# Patient Record
Sex: FEMALE | Race: OTHER | HISPANIC OR LATINO | URBAN - METROPOLITAN AREA
[De-identification: names, ages, dates, MRNs, and addresses within clinical notes are randomized per-mention and may not be internally consistent; named-entity substitution may affect disease eponyms.]

---

## 2017-03-12 ENCOUNTER — EMERGENCY (EMERGENCY)
Facility: HOSPITAL | Age: 52
LOS: 1 days | Discharge: HOME | End: 2017-03-12
Attending: EMERGENCY MEDICINE | Admitting: EMERGENCY MEDICINE
Payer: COMMERCIAL

## 2017-03-12 VITALS
TEMPERATURE: 98 F | OXYGEN SATURATION: 97 % | HEIGHT: 61 IN | HEART RATE: 83 BPM | DIASTOLIC BLOOD PRESSURE: 81 MMHG | SYSTOLIC BLOOD PRESSURE: 133 MMHG | WEIGHT: 143.74 LBS | RESPIRATION RATE: 18 BRPM

## 2017-03-12 VITALS
OXYGEN SATURATION: 97 % | DIASTOLIC BLOOD PRESSURE: 84 MMHG | HEART RATE: 78 BPM | TEMPERATURE: 98 F | RESPIRATION RATE: 18 BRPM | SYSTOLIC BLOOD PRESSURE: 128 MMHG

## 2017-03-12 DIAGNOSIS — R07.89 OTHER CHEST PAIN: ICD-10-CM

## 2017-03-12 DIAGNOSIS — R05 COUGH: ICD-10-CM

## 2017-03-12 DIAGNOSIS — J45.909 UNSPECIFIED ASTHMA, UNCOMPLICATED: ICD-10-CM

## 2017-03-12 PROCEDURE — 99283 EMERGENCY DEPT VISIT LOW MDM: CPT | Mod: 25

## 2017-03-12 PROCEDURE — 71046 X-RAY EXAM CHEST 2 VIEWS: CPT

## 2017-03-12 PROCEDURE — 99284 EMERGENCY DEPT VISIT MOD MDM: CPT | Mod: 25

## 2017-03-12 PROCEDURE — 93010 ELECTROCARDIOGRAM REPORT: CPT

## 2017-03-12 PROCEDURE — 71020: CPT | Mod: 26

## 2017-03-12 PROCEDURE — 93005 ELECTROCARDIOGRAM TRACING: CPT

## 2017-03-12 RX ORDER — HYDROCODONE BITARTRATE AND HOMATROPINE METHYLBROMIDE 5; 1.5 MG/5ML; MG/5ML
5 SOLUTION ORAL
Qty: 60 | Refills: 0
Start: 2017-03-12 | End: 2017-03-14

## 2017-03-12 RX ORDER — PROMETHAZINE HCL/CODEINE
5 SYRUP ORAL
Qty: 60 | Refills: 0
Start: 2017-03-12 | End: 2017-03-15

## 2017-03-12 RX ORDER — IBUPROFEN 200 MG
800 TABLET ORAL ONCE
Qty: 0 | Refills: 0 | Status: COMPLETED | OUTPATIENT
Start: 2017-03-12 | End: 2017-03-12

## 2017-03-12 RX ADMIN — Medication 800 MILLIGRAM(S): at 11:49

## 2017-03-12 NOTE — ED PROVIDER NOTE - OBJECTIVE STATEMENT
chest pain  52 yo with 4 days of URI symptoms w cough, now with C.P. L side only with coughing, currently no pain as cough improved but was feeling it prior to arrival, sharp in nature when coughs, no fever, non productive, no SOB, no leg pain or swelling

## 2017-03-12 NOTE — ED ADULT NURSE NOTE - OBJECTIVE STATEMENT
Patient presents to the ED complaining of non-productive cough since Thursday. Patient complaining of bilateral lower rib pain with cough. No fever or chills. Equal rise of chest. Lung sounds clear. No labored. breathing. Hx of asthma.

## 2017-03-12 NOTE — ED PROVIDER NOTE - CARDIAC, MLM
Normal rate, regular rhythm.  Heart sounds S1, S2.  No murmurs, rubs or gallops. L chest wall tender to palpation

## 2017-03-12 NOTE — ED PROVIDER NOTE - MEDICAL DECISION MAKING DETAILS
pt with chest pain w cough , now improving URI symptoms, suspect chest wall strain secondary to cough, no pna or pneumo on xray, no rib fracture apparent. EKG w no ischemic changes , doubt ACS. Pt stable for d/c w NSAIDs advised.

## 2017-04-19 ENCOUNTER — RESULT REVIEW (OUTPATIENT)
Age: 52
End: 2017-04-19

## 2017-04-20 ENCOUNTER — OUTPATIENT (OUTPATIENT)
Dept: OUTPATIENT SERVICES | Facility: HOSPITAL | Age: 52
LOS: 1 days | Discharge: HOME | End: 2017-04-20

## 2017-04-20 ENCOUNTER — OUTPATIENT (OUTPATIENT)
Dept: EMERGENCY DEPT | Facility: HOSPITAL | Age: 52
LOS: 1 days | Discharge: HOME | End: 2017-04-20
Payer: COMMERCIAL

## 2017-04-20 VITALS
TEMPERATURE: 98 F | SYSTOLIC BLOOD PRESSURE: 147 MMHG | HEART RATE: 127 BPM | DIASTOLIC BLOOD PRESSURE: 80 MMHG | OXYGEN SATURATION: 97 % | RESPIRATION RATE: 18 BRPM | HEIGHT: 61 IN

## 2017-04-20 VITALS
RESPIRATION RATE: 16 BRPM | HEART RATE: 98 BPM | DIASTOLIC BLOOD PRESSURE: 79 MMHG | OXYGEN SATURATION: 97 % | TEMPERATURE: 98 F | SYSTOLIC BLOOD PRESSURE: 132 MMHG

## 2017-04-20 DIAGNOSIS — L76.82 OTHER POSTPROCEDURAL COMPLICATIONS OF SKIN AND SUBCUTANEOUS TISSUE: ICD-10-CM
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
## 2017-04-20 DIAGNOSIS — Z90.13 ACQUIRED ABSENCE OF BILATERAL BREASTS AND NIPPLES: Chronic | ICD-10-CM

## 2017-04-20 LAB
ALBUMIN SERPL ELPH-MCNC: 3.3 G/DL — LOW (ref 3.4–5)
ALP SERPL-CCNC: 55 U/L — SIGNIFICANT CHANGE UP (ref 40–120)
ALT FLD-CCNC: 25 U/L — SIGNIFICANT CHANGE UP (ref 12–42)
ANION GAP SERPL CALC-SCNC: 7 MMOL/L — LOW (ref 9–16)
APTT BLD: 27.6 SEC — SIGNIFICANT CHANGE UP (ref 27.5–37.4)
AST SERPL-CCNC: 21 U/L — SIGNIFICANT CHANGE UP (ref 15–37)
BASOPHILS NFR BLD AUTO: 0.1 % — SIGNIFICANT CHANGE UP (ref 0–2)
BILIRUB SERPL-MCNC: 0.4 MG/DL — SIGNIFICANT CHANGE UP (ref 0.2–1.2)
BLD GP AB SCN SERPL QL: NEGATIVE — SIGNIFICANT CHANGE UP
BUN SERPL-MCNC: 13 MG/DL — SIGNIFICANT CHANGE UP (ref 7–23)
CALCIUM SERPL-MCNC: 8.7 MG/DL — SIGNIFICANT CHANGE UP (ref 8.5–10.5)
CHLORIDE SERPL-SCNC: 104 MMOL/L — SIGNIFICANT CHANGE UP (ref 96–108)
CO2 SERPL-SCNC: 25 MMOL/L — SIGNIFICANT CHANGE UP (ref 22–31)
CREAT SERPL-MCNC: 0.53 MG/DL — SIGNIFICANT CHANGE UP (ref 0.5–1.3)
EOSINOPHIL NFR BLD AUTO: 0.1 % — SIGNIFICANT CHANGE UP (ref 0–6)
GLUCOSE SERPL-MCNC: 164 MG/DL — HIGH (ref 70–99)
HCT VFR BLD CALC: 32.8 % — LOW (ref 34.5–45)
HGB BLD-MCNC: 11 G/DL — LOW (ref 11.5–15.5)
INR BLD: 1.04 — SIGNIFICANT CHANGE UP (ref 0.88–1.16)
LACTATE SERPL-SCNC: 3.3 MMOL/L — HIGH (ref 0.5–2)
LYMPHOCYTES # BLD AUTO: 5.7 % — LOW (ref 13–44)
MCHC RBC-ENTMCNC: 28.4 PG — SIGNIFICANT CHANGE UP (ref 27–34)
MCHC RBC-ENTMCNC: 33.5 G/DL — SIGNIFICANT CHANGE UP (ref 32–36)
MCV RBC AUTO: 84.5 FL — SIGNIFICANT CHANGE UP (ref 80–100)
MONOCYTES NFR BLD AUTO: 4.2 % — SIGNIFICANT CHANGE UP (ref 2–14)
NEUTROPHILS NFR BLD AUTO: 89.9 % — HIGH (ref 43–77)
PLATELET # BLD AUTO: 308 K/UL — SIGNIFICANT CHANGE UP (ref 150–400)
POTASSIUM SERPL-MCNC: 3.9 MMOL/L — SIGNIFICANT CHANGE UP (ref 3.5–5.3)
POTASSIUM SERPL-SCNC: 3.9 MMOL/L — SIGNIFICANT CHANGE UP (ref 3.5–5.3)
PROT SERPL-MCNC: 7.4 G/DL — SIGNIFICANT CHANGE UP (ref 6.4–8.2)
PROTHROM AB SERPL-ACNC: 11.6 SEC — SIGNIFICANT CHANGE UP (ref 9.8–12.7)
RBC # BLD: 3.88 M/UL — SIGNIFICANT CHANGE UP (ref 3.8–5.2)
RBC # FLD: 13.9 % — SIGNIFICANT CHANGE UP (ref 10.3–16.9)
RH IG SCN BLD-IMP: POSITIVE — SIGNIFICANT CHANGE UP
SODIUM SERPL-SCNC: 136 MMOL/L — SIGNIFICANT CHANGE UP (ref 135–145)
WBC # BLD: 12.5 K/UL — HIGH (ref 3.8–10.5)
WBC # FLD AUTO: 12.5 K/UL — HIGH (ref 3.8–10.5)

## 2017-04-20 PROCEDURE — 93010 ELECTROCARDIOGRAM REPORT: CPT

## 2017-04-20 PROCEDURE — 99285 EMERGENCY DEPT VISIT HI MDM: CPT | Mod: 25

## 2017-04-20 RX ORDER — SODIUM CHLORIDE 9 MG/ML
1000 INJECTION, SOLUTION INTRAVENOUS
Qty: 0 | Refills: 0 | Status: DISCONTINUED | OUTPATIENT
Start: 2017-04-20 | End: 2017-04-20

## 2017-04-20 RX ORDER — SODIUM CHLORIDE 9 MG/ML
1000 INJECTION INTRAMUSCULAR; INTRAVENOUS; SUBCUTANEOUS
Qty: 0 | Refills: 0 | Status: DISCONTINUED | OUTPATIENT
Start: 2017-04-20 | End: 2017-04-20

## 2017-04-20 NOTE — ED PROVIDER NOTE - PROGRESS NOTE DETAILS
spoke w/ patient, who saw the plastic surgery fellow earlier in the ED, was told she would need surgery today.  reached out to Dr. Gunn as no prearrival note or other forms of communication documentation available. per Dr. Gunn, OR available for pt, admit to service. pt was taken to OR by plastic fellow w/ labs pending.

## 2017-04-20 NOTE — ED ADULT NURSE NOTE - OBJECTIVE STATEMENT
Pt. presented with c/o swelling and pain to Rt breast s/p breast reduction surgery today. Pt. is A&Ox3, communicates w/o difficulty, Rt breast is noted to be hard to touch and swollen, minor bleeding noted to dressing under Rt breast. Pt. states she vomited today, denies fever, chills, chest pain, nausea, shortness of breath. Last meal reported at 3pm. Surgery at bedside.

## 2017-04-20 NOTE — ED ADULT TRIAGE NOTE - CHIEF COMPLAINT QUOTE
Patient c/o rt breast swelling , had breast reduction this morning . Denies any fever nor chills .  Was sent by surgeon for evaluation .

## 2017-04-20 NOTE — ED ADULT NURSE NOTE - CHPI ED SYMPTOMS NEG
no numbness/no vomiting/no decreased eating/drinking/no tingling/no weakness/no chills/no fever/no nausea/no dizziness

## 2017-04-20 NOTE — ED PROVIDER NOTE - MEDICAL DECISION MAKING DETAILS
sp breast reduction now w/ swelling to R breast, concern for hematoma, d/w plastic attending, plan for OR

## 2017-04-20 NOTE — ED PROVIDER NOTE - PHYSICAL EXAMINATION
CON: ao x 3, HENMT: clear oropharynx, soft neck, HEAD: atraumatic, CV: rrr, equal pulses b/l, RESP: cta b/l, GI: +BS, soft, nontender, no rebound, no guarding, SKIN: no rash, MSK: R breast swollen compared to L, no fluctuance, no erythema, NEURO: no gross motor or sensory deficit

## 2017-04-20 NOTE — ED PROVIDER NOTE - OBJECTIVE STATEMENT
51 yof pw R breast swelling sp breast reduction this AM.  was instructed by surgeon to be evaluated in the ED. 51 yof pw R breast swelling sp breast reduction this AM.  was instructed by surgeon to be evaluated in the ED.  pt reports swelling, and minimal pain.

## 2017-04-20 NOTE — H&P ADULT - NSHPPHYSICALEXAM_GEN_ALL_CORE
O/e    Has a markedly swollen and tense Right breast   Painful on palpation    Significantly different from the Left breast     Large accumulation of blood likely a result from a hematoma

## 2017-04-21 ENCOUNTER — RESULT REVIEW (OUTPATIENT)
Age: 52
End: 2017-04-21

## 2017-04-21 LAB — SURGICAL PATHOLOGY STUDY: SIGNIFICANT CHANGE UP

## 2017-04-21 PROCEDURE — 93005 ELECTROCARDIOGRAM TRACING: CPT

## 2017-04-21 PROCEDURE — 83605 ASSAY OF LACTIC ACID: CPT

## 2017-04-21 PROCEDURE — 80053 COMPREHEN METABOLIC PANEL: CPT

## 2017-04-21 PROCEDURE — 86900 BLOOD TYPING SEROLOGIC ABO: CPT

## 2017-04-21 PROCEDURE — 86901 BLOOD TYPING SEROLOGIC RH(D): CPT

## 2017-04-21 PROCEDURE — 85610 PROTHROMBIN TIME: CPT

## 2017-04-21 PROCEDURE — 36415 COLL VENOUS BLD VENIPUNCTURE: CPT

## 2017-04-21 PROCEDURE — 85025 COMPLETE CBC W/AUTO DIFF WBC: CPT

## 2017-04-21 PROCEDURE — 86850 RBC ANTIBODY SCREEN: CPT

## 2017-04-21 PROCEDURE — 10140 I&D HMTMA SEROMA/FLUID COLLJ: CPT | Mod: 78

## 2017-04-21 PROCEDURE — 87040 BLOOD CULTURE FOR BACTERIA: CPT

## 2017-04-21 PROCEDURE — 99285 EMERGENCY DEPT VISIT HI MDM: CPT | Mod: 25

## 2017-04-21 PROCEDURE — 85730 THROMBOPLASTIN TIME PARTIAL: CPT
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
## 2017-04-24 DIAGNOSIS — J45.909 UNSPECIFIED ASTHMA, UNCOMPLICATED: ICD-10-CM

## 2017-04-24 DIAGNOSIS — L76.32 POSTPROCEDURAL HEMATOMA OF SKIN AND SUBCUTANEOUS TISSUE FOLLOWING OTHER PROCEDURE: ICD-10-CM

## 2017-04-25 LAB
CULTURE RESULTS: SIGNIFICANT CHANGE UP
CULTURE RESULTS: SIGNIFICANT CHANGE UP
SPECIMEN SOURCE: SIGNIFICANT CHANGE UP
SPECIMEN SOURCE: SIGNIFICANT CHANGE UP

## 2017-04-27 DIAGNOSIS — N62 HYPERTROPHY OF BREAST: ICD-10-CM

## 2017-04-27 DIAGNOSIS — N60.12 DIFFUSE CYSTIC MASTOPATHY OF LEFT BREAST: ICD-10-CM

## 2017-04-27 DIAGNOSIS — N60.11 DIFFUSE CYSTIC MASTOPATHY OF RIGHT BREAST: ICD-10-CM

## 2017-04-27 DIAGNOSIS — J45.909 UNSPECIFIED ASTHMA, UNCOMPLICATED: ICD-10-CM

## 2018-05-11 ENCOUNTER — APPOINTMENT (OUTPATIENT)
Dept: ULTRASOUND IMAGING | Facility: HOSPITAL | Age: 53
End: 2018-05-11
Payer: COMMERCIAL

## 2018-05-11 ENCOUNTER — RESULT REVIEW (OUTPATIENT)
Age: 53
End: 2018-05-11

## 2018-05-11 ENCOUNTER — OUTPATIENT (OUTPATIENT)
Dept: OUTPATIENT SERVICES | Facility: HOSPITAL | Age: 53
LOS: 1 days | End: 2018-05-11
Payer: COMMERCIAL

## 2018-05-11 DIAGNOSIS — Z90.13 ACQUIRED ABSENCE OF BILATERAL BREASTS AND NIPPLES: Chronic | ICD-10-CM

## 2018-05-11 PROCEDURE — 77065 DX MAMMO INCL CAD UNI: CPT | Mod: 26,RT

## 2018-05-11 PROCEDURE — A4648: CPT

## 2018-05-11 PROCEDURE — 19083 BX BREAST 1ST LESION US IMAG: CPT

## 2018-05-11 PROCEDURE — 88305 TISSUE EXAM BY PATHOLOGIST: CPT

## 2018-05-11 PROCEDURE — 19083 BX BREAST 1ST LESION US IMAG: CPT | Mod: RT

## 2018-05-11 PROCEDURE — 77065 DX MAMMO INCL CAD UNI: CPT

## 2018-05-14 LAB — SURGICAL PATHOLOGY STUDY: SIGNIFICANT CHANGE UP

## 2018-11-02 PROBLEM — J45.909 UNSPECIFIED ASTHMA, UNCOMPLICATED: Chronic | Status: ACTIVE | Noted: 2017-03-12

## 2018-11-14 ENCOUNTER — APPOINTMENT (OUTPATIENT)
Dept: BREAST CENTER | Facility: CLINIC | Age: 53
End: 2018-11-14
Payer: COMMERCIAL

## 2018-11-14 VITALS
HEART RATE: 66 BPM | WEIGHT: 114 LBS | BODY MASS INDEX: 22.38 KG/M2 | HEIGHT: 60 IN | TEMPERATURE: 98 F | DIASTOLIC BLOOD PRESSURE: 82 MMHG | SYSTOLIC BLOOD PRESSURE: 125 MMHG

## 2018-11-14 DIAGNOSIS — N63.10 UNSPECIFIED LUMP IN THE RIGHT BREAST, UNSPECIFIED QUADRANT: ICD-10-CM

## 2018-11-14 PROCEDURE — 99213 OFFICE O/P EST LOW 20 MIN: CPT

## 2018-12-06 ENCOUNTER — FORM ENCOUNTER (OUTPATIENT)
Age: 53
End: 2018-12-06

## 2018-12-06 ENCOUNTER — RESULT REVIEW (OUTPATIENT)
Age: 53
End: 2018-12-06

## 2018-12-07 ENCOUNTER — APPOINTMENT (OUTPATIENT)
Dept: ULTRASOUND IMAGING | Facility: HOSPITAL | Age: 53
End: 2018-12-07
Payer: COMMERCIAL

## 2018-12-07 ENCOUNTER — OUTPATIENT (OUTPATIENT)
Dept: OUTPATIENT SERVICES | Facility: HOSPITAL | Age: 53
LOS: 1 days | End: 2018-12-07
Payer: COMMERCIAL

## 2018-12-07 DIAGNOSIS — Z90.13 ACQUIRED ABSENCE OF BILATERAL BREASTS AND NIPPLES: Chronic | ICD-10-CM

## 2018-12-07 PROCEDURE — 77065 DX MAMMO INCL CAD UNI: CPT

## 2018-12-07 PROCEDURE — 76642 ULTRASOUND BREAST LIMITED: CPT

## 2018-12-07 PROCEDURE — 76642 ULTRASOUND BREAST LIMITED: CPT | Mod: 26,RT

## 2018-12-07 PROCEDURE — A4648: CPT

## 2018-12-07 PROCEDURE — 88305 TISSUE EXAM BY PATHOLOGIST: CPT

## 2018-12-07 PROCEDURE — 19083 BX BREAST 1ST LESION US IMAG: CPT

## 2018-12-07 PROCEDURE — 19083 BX BREAST 1ST LESION US IMAG: CPT | Mod: RT

## 2018-12-07 PROCEDURE — 77065 DX MAMMO INCL CAD UNI: CPT | Mod: 26,RT

## 2018-12-10 ENCOUNTER — CHART COPY (OUTPATIENT)
Age: 53
End: 2018-12-10

## 2018-12-10 LAB — SURGICAL PATHOLOGY STUDY: SIGNIFICANT CHANGE UP

## 2019-12-24 ENCOUNTER — APPOINTMENT (OUTPATIENT)
Dept: SPINE | Facility: CLINIC | Age: 54
End: 2019-12-24
Payer: COMMERCIAL

## 2019-12-24 VITALS
WEIGHT: 122 LBS | DIASTOLIC BLOOD PRESSURE: 84 MMHG | OXYGEN SATURATION: 95 % | SYSTOLIC BLOOD PRESSURE: 131 MMHG | HEART RATE: 76 BPM | BODY MASS INDEX: 23.83 KG/M2

## 2019-12-24 DIAGNOSIS — M54.5 LOW BACK PAIN: ICD-10-CM

## 2019-12-24 DIAGNOSIS — M51.26 OTHER INTERVERTEBRAL DISC DISPLACEMENT, LUMBAR REGION: ICD-10-CM

## 2019-12-24 DIAGNOSIS — M51.36 OTHER INTERVERTEBRAL DISC DEGENERATION, LUMBAR REGION: ICD-10-CM

## 2019-12-24 DIAGNOSIS — Z83.3 FAMILY HISTORY OF DIABETES MELLITUS: ICD-10-CM

## 2019-12-24 PROCEDURE — 99204 OFFICE O/P NEW MOD 45 MIN: CPT

## 2020-08-03 ENCOUNTER — LABORATORY RESULT (OUTPATIENT)
Age: 55
End: 2020-08-03

## 2020-08-06 ENCOUNTER — APPOINTMENT (OUTPATIENT)
Dept: PULMONOLOGY | Facility: CLINIC | Age: 55
End: 2020-08-06
Payer: COMMERCIAL

## 2020-08-06 VITALS
BODY MASS INDEX: 24.35 KG/M2 | WEIGHT: 124 LBS | HEART RATE: 99 BPM | TEMPERATURE: 98.6 F | SYSTOLIC BLOOD PRESSURE: 120 MMHG | HEIGHT: 60 IN | DIASTOLIC BLOOD PRESSURE: 80 MMHG | OXYGEN SATURATION: 97 %

## 2020-08-06 PROCEDURE — 94010 BREATHING CAPACITY TEST: CPT

## 2020-08-06 PROCEDURE — 99204 OFFICE O/P NEW MOD 45 MIN: CPT | Mod: 25

## 2020-08-06 RX ORDER — FLUTICASONE PROPIONATE 220 MCG
AEROSOL WITH ADAPTER (GRAM) INHALATION
Refills: 0 | Status: ACTIVE | COMMUNITY

## 2020-08-06 RX ORDER — MONTELUKAST 10 MG/1
10 TABLET, FILM COATED ORAL
Refills: 0 | Status: ACTIVE | COMMUNITY

## 2020-08-06 NOTE — CONSULT LETTER
[Dear  ___] : Dear  [unfilled], [( Thank you for referring [unfilled] for consultation for _____ )] : Thank you for referring [unfilled] for consultation for [unfilled] [Please see my note below.] : Please see my note below. [Consult Closing:] : Thank you very much for allowing me to participate in the care of this patient.  If you have any questions, please do not hesitate to contact me. [Sincerely,] : Sincerely, [FreeTextEntry3] : Mary Thomson MD, FCCP\par  [FreeTextEntry2] : David Erickson MD\par [address] \[place] [address]

## 2020-08-06 NOTE — PHYSICAL EXAM
[No Acute Distress] : no acute distress [Normal S1, S2] : normal s1, s2 [Normal Appearance] : normal appearance [Normal Rate/Rhythm] : normal rate/rhythm [No Murmurs] : no murmurs [Benign] : benign [No Edema] : no edema [Normal Color/ Pigmentation] : normal color/ pigmentation [No Clubbing] : no clubbing [Normal Affect] : normal affect [TextBox_68] : insp wheezing

## 2020-08-06 NOTE — HISTORY OF PRESENT ILLNESS
[TextBox_4] : 08/06/2020: Asked to evaluate patient by Dr Erickson for asthma. Asthma since childhood, was better when living in [place] for a few years, but when she returned to [facility] 30 years ago it got worse. Never admitted, but several ER visits, last a few years ago. Last prednisone for asthma also years ago. Sometimes docs have rx'd prednisone but she's afraid to take the steroid. Currently takes Flovent maybe 3-4x a week, montelukast every day, and albuterol pump and nebs every day and waking up at night dyspneic and tight. Never smoker, works as a . Feels that she can't breathe through her nose but denies stuffy nose, congestion.\par

## 2020-08-06 NOTE — ASSESSMENT
[FreeTextEntry1] : Data reviewed:\par \par PA/lat CXR [facility] 2/2020 personally reviewed : clear lungs\par \par [place] 08/06/2020 : mild restriction\par \par Impression:\par Asthma, uncontrolled by symptoms\par \par Plan:\par Although her spirometry is not bad, by history she has uncontrolled asthma because she does not regularly use ICS. I advised her to continue montelukast and add Flovent 220mcg bid (she doesn't know what dose inhaler she has at home) and follow up in a month. We reviewed the potential consequences of airway remodeling in uncontrolled asthma and the safety of ICS.

## 2020-08-31 ENCOUNTER — APPOINTMENT (OUTPATIENT)
Dept: ORTHOPEDIC SURGERY | Facility: CLINIC | Age: 55
End: 2020-08-31
Payer: COMMERCIAL

## 2020-08-31 VITALS
HEIGHT: 60 IN | OXYGEN SATURATION: 98 % | DIASTOLIC BLOOD PRESSURE: 78 MMHG | BODY MASS INDEX: 24.35 KG/M2 | TEMPERATURE: 97.7 F | SYSTOLIC BLOOD PRESSURE: 138 MMHG | HEART RATE: 57 BPM | WEIGHT: 124 LBS

## 2020-08-31 DIAGNOSIS — M75.82 OTHER SHOULDER LESIONS, LEFT SHOULDER: ICD-10-CM

## 2020-08-31 DIAGNOSIS — M75.42 IMPINGEMENT SYNDROME OF LEFT SHOULDER: ICD-10-CM

## 2020-08-31 PROCEDURE — 99204 OFFICE O/P NEW MOD 45 MIN: CPT

## 2020-08-31 NOTE — PHYSICAL EXAM
[de-identified] : General: Well-nourished, well-developed, alert, and in no acute distress.\par Head: Normocephalic.\par Eyes: Pupils equal round reactive to light and accommodation, extraocular muscles intact, normal sclera.\par Nose: No nasal discharge.\par Cardiac: Regular rate. Extremities are warm and well perfused. Distal pulses are symmetric bilaterally.\par Respiratory: No labored breathing.\par Extremities: Sensation is intact distally bilaterally.  Distal pulses are symmetric bilaterally\par Lymphatic: No regional lymphadenopathy, no lymphedema\par Neurologic: No focal deficits\par Skin: Normal skin color, texture, and turgor\par Psychiatric: Normal affect\par MSK: as noted above/below\par \par \par \par RIGHT SHOULDER:\par  \par Inspection:no bruising, rash, erythema, deformity\par Joint Effusion:no\par ROM:normal Forward Flexion, Abduction , Internal Rotation: , External Rotation \par Palpation: NO AC joint pain, Bicipital Groove Pain , GH joint pain , Clavicle pain , GT pain \par Distal Pulses:normal\par Upper Extremity Reflexes:2+\par Shoulder Strength: 5/5 \par Upper Extremity Sensation: normal\par \par Special Tests:\par Empty Can: Negative \par Lift-Off Test: Negative \par O'Briens: Negative\par Cross Arm: Negative\par Neer: Negative\par Lyons: Negative\par Speed: Negative\par \par LEFT SHOULDER:\par  \par Inspection:no bruising, rash, erythema, deformity\par Joint Effusion:no\par ROM:normal Forward Flexion, Abduction , MILDLY DECREASED Internal Rotation TO SACRUM , NORMAL External Rotation \par Palpation: PAIN AT SUBACROMIAL AREA, PAIN AT SUPRASPINATUS FOOTPRINT, NO AC joint pain, Bicipital Groove Pain , GH joint pain , Clavicle pain , GT pain \par Distal Pulses:normal\par Upper Extremity Reflexes:2+\par Shoulder Strength: 5/5 \par Upper Extremity Sensation: normal\par \par Special Tests:\par Empty Can: Negative \par Lift-Off Test: POSITIVE\par O'Briens: POSITIVE\par Cross Arm: POSITIVE\par Neer: POSITIVE\par Lyons: POSITIVE\par Speed: Negative\par Apprehension:PAINFUL\par \par  [de-identified] : Xray LEFT Shoulder form [facility] - Multiple views were reviewed with the patient in detail.  There is no acute fracture or dislocation.  There is no joint effusion.  Mild AC arthrosis.

## 2020-08-31 NOTE — HISTORY OF PRESENT ILLNESS
[de-identified] : The patient is a 54 year old, RHD woman presenting with left shoulder pain.\par \par The patient presents with a three month history of atraumatic, left shoulder pain.  She states that the pain started after doing some moving in her apartment back in June 2020.  She denies feeling an acute pop or snap.  She denies focal neck pain.  The patient denies distal numbness, weakness, paresthesias.  Pain is worse with reaching activities and sleeping on the affected side.  She endorses a clicking sensation.  She has tried Tylenol/Advil.\par \par Pain is rated 4/10, described as sharp/achy, improved with rest, worse with reaching activity. [4] : a current pain level of 4/10

## 2020-08-31 NOTE — DISCUSSION/SUMMARY
[Medication Risks Reviewed] : Medication risks reviewed [de-identified] : The patient is a 54 year old, RHD woman presenting with a three month history of left shoulder pain.  Her pain is likely secondary to impingement syndrome/rotator cuff tendinopathy.\par \par Imaging/Diagnostics were interpreted and results were reviewed with the patient in detail.  All questions were answered appropriately.\par \par The patient was counseled on the natural progression of the problem today.  The patient was provided reassurance today.\par \par Patient was prescribed a course of physical therapy today.  The patient was also provided some general home exercises.  The patient was counseled on activity modification.\par \par Patient was prescribed a short course of Diclofenac .Appropriate use of medication was reviewed with the patient in detail. Risks, benefits, and adverse effects medication were discussed.\par \par Follow-up in 4-6 months.  If pain persists, consider MRI vs. injections.\par \par ------------------------------------------------------------------------------------------------------------------\par Patient appreciates and agrees with current plan.\par \par This note was generated using a mixture of manual typing and dragon medical dictation software.  A reasonable effort has been made for proofreading its contents, but typos may still remain.  If there are any questions or points of clarification needed please notify my office.\par \par >45 minutes of time was spent on total encounter.  >50% of the visit was spent on counseling/coordination of care and medical-decision making for this patient.\par \par \par

## 2022-08-25 NOTE — H&P ADULT - HISTORY OF PRESENT ILLNESS
Patient noted that earlier this afternoon around 1300 when she started placing ice onto her Breasts, that the right one become markedly swollen and tense     It continued to get larger and larger    She then placed a phone call to the office who recommended that she go to the ED ASAP
worsening

## 2023-05-10 NOTE — ED PROVIDER NOTE - RESPIRATORY, MLM
Ambulatory Care Coordination Note    ACM attempted third and final call to patient for introduction to Associate Care Management. Unable to leave a message, mailbox full     Final Unable to Reach Letter sent to patient via My Chart . No further outreach scheduled with this ACM, patient has been provided with this ACM's contact information. ACM will sign off care team at this time. Future Appointments   Date Time Provider Destiny Moon   5/30/2023 11:30 AM THADDEUS Savage  Mimi COOPER, RN- [facility]  Associate Care Manager  [phone]  [email or web address] Breath sounds clear and equal bilaterally.

## 2023-09-12 ENCOUNTER — NON-APPOINTMENT (OUTPATIENT)
Age: 58
End: 2023-09-12

## 2023-09-12 ENCOUNTER — APPOINTMENT (OUTPATIENT)
Dept: COLORECTAL SURGERY | Facility: CLINIC | Age: 58
End: 2023-09-12
Payer: COMMERCIAL

## 2023-09-12 VITALS
SYSTOLIC BLOOD PRESSURE: 115 MMHG | BODY MASS INDEX: 26.7 KG/M2 | HEART RATE: 85 BPM | WEIGHT: 136 LBS | TEMPERATURE: 98 F | HEIGHT: 60 IN | DIASTOLIC BLOOD PRESSURE: 76 MMHG

## 2023-09-12 DIAGNOSIS — L29.0 PRURITUS ANI: ICD-10-CM

## 2023-09-12 PROCEDURE — 99203 OFFICE O/P NEW LOW 30 MIN: CPT | Mod: 25

## 2023-09-12 PROCEDURE — 46600 DIAGNOSTIC ANOSCOPY SPX: CPT

## 2023-10-18 ENCOUNTER — EMERGENCY (EMERGENCY)
Facility: HOSPITAL | Age: 58
LOS: 1 days | Discharge: HOME | End: 2023-10-18
Attending: EMERGENCY MEDICINE | Admitting: EMERGENCY MEDICINE
Payer: COMMERCIAL

## 2023-10-18 VITALS
TEMPERATURE: 98 F | HEART RATE: 108 BPM | OXYGEN SATURATION: 95 % | WEIGHT: 134.04 LBS | HEIGHT: 61 IN | RESPIRATION RATE: 18 BRPM | SYSTOLIC BLOOD PRESSURE: 161 MMHG | DIASTOLIC BLOOD PRESSURE: 79 MMHG

## 2023-10-18 VITALS
SYSTOLIC BLOOD PRESSURE: 145 MMHG | DIASTOLIC BLOOD PRESSURE: 94 MMHG | HEART RATE: 101 BPM | RESPIRATION RATE: 19 BRPM | OXYGEN SATURATION: 94 % | TEMPERATURE: 98 F

## 2023-10-18 DIAGNOSIS — Z90.13 ACQUIRED ABSENCE OF BILATERAL BREASTS AND NIPPLES: Chronic | ICD-10-CM

## 2023-10-18 PROCEDURE — 71046 X-RAY EXAM CHEST 2 VIEWS: CPT | Mod: 26

## 2023-10-18 PROCEDURE — 99283 EMERGENCY DEPT VISIT LOW MDM: CPT | Mod: 25

## 2023-10-18 PROCEDURE — 99284 EMERGENCY DEPT VISIT MOD MDM: CPT

## 2023-10-18 PROCEDURE — 71046 X-RAY EXAM CHEST 2 VIEWS: CPT

## 2023-10-18 RX ORDER — HYDROCODONE BITARTRATE AND HOMATROPINE METHYLBROMIDE 5; 1.5 MG/5ML; MG/5ML
5 SOLUTION ORAL
Qty: 75 | Refills: 0
Start: 2023-10-18 | End: 2023-10-22

## 2023-10-18 RX ORDER — HYDROCODONE/PSEUDOEPHED/GUAIF 5-60-200MG
10 EXPECTORANT ORAL
Qty: 150 | Refills: 0
Start: 2023-10-18 | End: 2023-10-22

## 2023-10-18 NOTE — ED ADULT NURSE NOTE - NSFALLUNIVINTERV_ED_ALL_ED
Bed/Stretcher in lowest position, wheels locked, appropriate side rails in place/Call bell, personal items and telephone in reach/Instruct patient to call for assistance before getting out of bed/chair/stretcher/Non-slip footwear applied when patient is off stretcher/[place] to call system/Physically safe environment - no spills, clutter or unnecessary equipment/Purposeful proactive rounding/Room/bathroom lighting operational, light cord in reach

## 2023-10-18 NOTE — ED PROVIDER NOTE - OBJECTIVE STATEMENT
The pt is a 57 y/o F, who presents to ED c/o cough x 1 wk. pt states cough is dry, non productive, having L sided rib pain w/coughing, went to [facility] and given steroids and NSAIDs, states no relief, pt requesting cough meds w/codeine. Denies fevers, chills, cp, sob, n/v/d, abd pain, dizziness.

## 2023-10-18 NOTE — ED PROVIDER NOTE - RESPIRATORY, MLM
Breath sounds clear and equal bilaterally. no rales, no rhonchi, no wheezes b/l, speaking in long full sentences, no cough noted, + L lower intra rib discomfort, no rash, no vesicles

## 2023-10-18 NOTE — ED ADULT NURSE NOTE - OBJECTIVE STATEMENT
59 y/o F with pmhx HTN and asthma presents to the ED c/o cough and right anterior lower rib pain worsening x5 days. Reports h/o similar sx with costochondritis that resolved with "codeine cough medicine" in the past and is here to be prescribed the same. States she was seen by her PCP yesterday and started on medications without relief of sx so she came here today. Denies SOB, CP, N/V/D, and any other complaints at this time. On exam, A&Ox4, NAD, ambulatory 94-95% on RA. Respirations are even and unlabored. Lungs are CTA b/l. Dry cough noted occasionally. Speaking in complete sentences.

## 2023-10-18 NOTE — ED PROVIDER NOTE - CLINICAL SUMMARY MEDICAL DECISION MAKING FREE TEXT BOX
pt c/o cough x 1 wk, now having l sided rib pain w/coughing, suspect msk, no trauma to suggest rib fx, no concern for acs. no sob/fevers/dizziness, cxr clear, symptoms controlled w/hycodan, will rx same to go home, pt has rx for voltaren from [facility] and will take that prn, pt understands and agrees w/plan, strict return precautions given

## 2023-10-18 NOTE — ED PROVIDER NOTE - NS ED ATTENDING STATEMENT MOD
This was a shared visit with the KM. I reviewed and verified the documentation and independently performed the documented:

## 2023-10-18 NOTE — ED ADULT TRIAGE NOTE - CHIEF COMPLAINT QUOTE
Pt co non productive cough x5 days, started after receiving flu shot. Denies associated sx. Hx asthma never requiring intubation.

## 2023-10-18 NOTE — ED PROVIDER NOTE - ATTENDING APP SHARED VISIT CONTRIBUTION OF CARE
Attending Statement: I have personally performed a diagnostic evaluation on this patient. I have reviewed the ACP note and agree with the history, exam and plan of care, except as noted.     Attending Contribution to Care:  58F who p/w cough x 1 wk, assoc w L sided rib pain w/coughing, suspect msk, no trauma to suggest rib fx, Do not suspect ACS, PE, dissection or other acute life-threatening pathology at this time. Symptoms controlled w/hycodan, will rx same to go home, pt has rx for voltaren from [facility] and will take that prn, pt understands and agrees w/plan, strict return precautions given

## 2023-10-20 DIAGNOSIS — R07.81 PLEURODYNIA: ICD-10-CM

## 2023-10-20 DIAGNOSIS — R05.9 COUGH, UNSPECIFIED: ICD-10-CM

## 2024-05-16 ENCOUNTER — APPOINTMENT (OUTPATIENT)
Dept: ENDOCRINOLOGY | Facility: CLINIC | Age: 59
End: 2024-05-16
Payer: COMMERCIAL

## 2024-05-16 VITALS
WEIGHT: 143 LBS | DIASTOLIC BLOOD PRESSURE: 85 MMHG | BODY MASS INDEX: 27.93 KG/M2 | HEART RATE: 71 BPM | SYSTOLIC BLOOD PRESSURE: 130 MMHG

## 2024-05-16 DIAGNOSIS — E04.2 NONTOXIC MULTINODULAR GOITER: ICD-10-CM

## 2024-05-16 PROCEDURE — 99203 OFFICE O/P NEW LOW 30 MIN: CPT

## 2024-05-16 RX ORDER — DICLOFENAC SODIUM 50 MG/1
50 TABLET, DELAYED RELEASE ORAL
Qty: 180 | Refills: 0 | Status: DISCONTINUED | COMMUNITY
Start: 2020-08-31 | End: 2024-05-16

## 2024-05-16 RX ORDER — TRIAMCINOLONE ACETONIDE 1 MG/G
0.1 CREAM TOPICAL
Refills: 0 | Status: ACTIVE | COMMUNITY

## 2024-05-16 RX ORDER — CLOTRIMAZOLE AND BETAMETHASONE DIPROPIONATE 10; .5 MG/G; MG/G
1-0.05 CREAM TOPICAL TWICE DAILY
Qty: 1 | Refills: 3 | Status: DISCONTINUED | COMMUNITY
Start: 2023-09-12 | End: 2024-05-16

## 2024-05-16 NOTE — PHYSICAL EXAM
[Alert] : alert [No Acute Distress] : no acute distress [No Proptosis] : no proptosis [No Lid Lag] : no lid lag [No LAD] : no lymphadenopathy [Clear to Auscultation] : lungs were clear to auscultation bilaterally [Normal S1, S2] : normal S1 and S2 [Regular Rhythm] : with a regular rhythm [Normal Affect] : the affect was normal [Normal Mood] : the mood was normal [de-identified] : thyroid enlarged, soft, nodular

## 2024-05-16 NOTE — DATA REVIEWED
[FreeTextEntry1] : 6/23  TSH 2.60, tot chol 204, HDL 59, trig 129, , A1c 5.6%, 25D 43  thyroid sono, 11/23:  markedly heterogeneous with multiple sub-cm echogenic and hypoechoic nodules L lower nodule 17 x 19x 21mm, tall > wide.  FNA, 12/23:  follicular nodular disease  L lower posterior nodule 23 x 13 x 14mm, markedly hypoechoic.  FNA, 12/23:  follicular nodular disease L mid nodule 16 x 12 x 14mm, hypoechoic  isthmus nodules:  1.2cm and 5mm, hypoechoic

## 2024-05-16 NOTE — HISTORY OF PRESENT ILLNESS
[FreeTextEntry1] : Multinodular goiter seen incidentally on MRI neck. no history of RT to neck.  Her daughter recently found out she may have a thyroid condition. She was sent for FNA of two nodules, and pathology was benign. Weight has been increasing the past 3-4 months but she thinks it is from her diet; her neighbor has been bringing food (rice, beans). Menopause at age 51.  She takes Centrum multivitamin for women over 50. She had cortisone injection to knee; she used to walk a lot but was told now, not to walk too much thyroid sono reviewed:   Two L nodules, 2.3 and 2.1cm, both benign.  other sub-cm nodules labs reviewed from 6/23:  normal TSH

## 2024-05-16 NOTE — REASON FOR VISIT
[Initial Evaluation] : an initial evaluation [Thyroid nodule/ MNG] : thyroid nodule/ [facility] [FreeTextEntry2] : Dr Erickson

## 2024-05-16 NOTE — ASSESSMENT
[FreeTextEntry1] : Multinodular goiter,  Euthyroid. Explained that the overwhelming majority of thyroid nodules (over 90%) are benign.  Will recommend repeat FNA biopsy if nodule increasing over 20% in size, or showing suspicious features (microcalcifications, irregular borders, tall > wide, hypervascularity). Otherwise, can monitor TSH and sonogram once every 1-2 years. repeat thyroid sono Jan 2025 RTO Feb 2025

## 2024-09-12 NOTE — ED ADULT NURSE NOTE - PRIMARY CARE PROVIDER
unknown Detail Level: Generalized Sunscreen Recommendations: Sun screen (SPF 30 or greater) should be applied during peak UV exposure (between 10am and 2pm) and reapplied after exercise or swimming.\\nRecommended La Roche- Posay Anthelios with SPF 60 or Chas Tone Water Babies with SPF 30 and above. When Should The Patient Follow-Up For Their Next Full-Body Skin Exam?: 4 Months Detail Level: Detailed Quality 137: Melanoma: Continuity Of Care - Recall System: Patient information entered into a recall system that includes: target date for the next exam specified AND a process to follow up with patients regarding missed or unscheduled appointments

## 2025-03-28 ENCOUNTER — APPOINTMENT (OUTPATIENT)
Dept: INTERNAL MEDICINE | Facility: CLINIC | Age: 60
End: 2025-03-28
Payer: COMMERCIAL

## 2025-03-28 VITALS
SYSTOLIC BLOOD PRESSURE: 142 MMHG | OXYGEN SATURATION: 95 % | WEIGHT: 139 LBS | TEMPERATURE: 98 F | BODY MASS INDEX: 27.29 KG/M2 | HEIGHT: 60 IN | DIASTOLIC BLOOD PRESSURE: 86 MMHG | HEART RATE: 83 BPM

## 2025-03-28 VITALS — SYSTOLIC BLOOD PRESSURE: 135 MMHG | DIASTOLIC BLOOD PRESSURE: 84 MMHG

## 2025-03-28 DIAGNOSIS — N63.10 UNSPECIFIED LUMP IN THE RIGHT BREAST, UNSPECIFIED QUADRANT: ICD-10-CM

## 2025-03-28 DIAGNOSIS — M25.562 PAIN IN RIGHT KNEE: ICD-10-CM

## 2025-03-28 DIAGNOSIS — R20.2 PARESTHESIA OF SKIN: ICD-10-CM

## 2025-03-28 DIAGNOSIS — Z00.01 ENCOUNTER FOR GENERAL ADULT MEDICAL EXAMINATION WITH ABNORMAL FINDINGS: ICD-10-CM

## 2025-03-28 DIAGNOSIS — E66.3 OVERWEIGHT: ICD-10-CM

## 2025-03-28 DIAGNOSIS — F32.1 MAJOR DEPRESSIVE DISORDER, SINGLE EPISODE, MODERATE: ICD-10-CM

## 2025-03-28 DIAGNOSIS — I10 ESSENTIAL (PRIMARY) HYPERTENSION: ICD-10-CM

## 2025-03-28 DIAGNOSIS — Z00.00 ENCOUNTER FOR GENERAL ADULT MEDICAL EXAMINATION W/OUT ABNORMAL FINDINGS: ICD-10-CM

## 2025-03-28 DIAGNOSIS — E78.5 HYPERLIPIDEMIA, UNSPECIFIED: ICD-10-CM

## 2025-03-28 DIAGNOSIS — J45.909 UNSPECIFIED ASTHMA, UNCOMPLICATED: ICD-10-CM

## 2025-03-28 DIAGNOSIS — E04.2 NONTOXIC MULTINODULAR GOITER: ICD-10-CM

## 2025-03-28 DIAGNOSIS — M25.561 PAIN IN RIGHT KNEE: ICD-10-CM

## 2025-03-28 DIAGNOSIS — Z12.39 ENCOUNTER FOR OTHER SCREENING FOR MALIGNANT NEOPLASM OF BREAST: ICD-10-CM

## 2025-03-28 PROCEDURE — G0444 DEPRESSION SCREEN ANNUAL: CPT | Mod: 59

## 2025-03-28 PROCEDURE — 99203 OFFICE O/P NEW LOW 30 MIN: CPT | Mod: 25,GC

## 2025-03-28 PROCEDURE — 99386 PREV VISIT NEW AGE 40-64: CPT | Mod: 25

## 2025-03-28 PROCEDURE — 36415 COLL VENOUS BLD VENIPUNCTURE: CPT

## 2025-03-28 RX ORDER — BUDESONIDE AND FORMOTEROL FUMARATE DIHYDRATE 80; 4.5 UG/1; UG/1
80-4.5 AEROSOL RESPIRATORY (INHALATION) TWICE DAILY
Qty: 1 | Refills: 0 | Status: ACTIVE | COMMUNITY
Start: 2025-03-28 | End: 1900-01-01

## 2025-03-28 RX ORDER — AMLODIPINE BESYLATE 5 MG/1
5 TABLET ORAL
Qty: 30 | Refills: 0 | Status: ACTIVE | COMMUNITY
Start: 2025-03-28 | End: 1900-01-01

## 2025-03-28 RX ORDER — ALBUTEROL SULFATE 2.5 MG/3ML
(2.5 MG/3ML) SOLUTION RESPIRATORY (INHALATION)
Qty: 1 | Refills: 0 | Status: ACTIVE | COMMUNITY
Start: 2025-03-28 | End: 1900-01-01

## 2025-03-31 LAB
ALBUMIN SERPL ELPH-MCNC: 4.5 G/DL
ALP BLD-CCNC: 64 U/L
ALT SERPL-CCNC: 32 U/L
ANION GAP SERPL CALC-SCNC: 10 MMOL/L
AST SERPL-CCNC: 34 U/L
BASOPHILS # BLD AUTO: 0.09 K/UL
BASOPHILS NFR BLD AUTO: 1.3 %
BILIRUB SERPL-MCNC: 0.3 MG/DL
BUN SERPL-MCNC: 14 MG/DL
CALCIUM SERPL-MCNC: 9.6 MG/DL
CHLORIDE SERPL-SCNC: 106 MMOL/L
CHOLEST SERPL-MCNC: 174 MG/DL
CO2 SERPL-SCNC: 23 MMOL/L
CREAT SERPL-MCNC: 0.87 MG/DL
CREAT SPEC-SCNC: 119 MG/DL
EGFRCR SERPLBLD CKD-EPI 2021: 76 ML/MIN/1.73M2
EOSINOPHIL # BLD AUTO: 0.89 K/UL
EOSINOPHIL NFR BLD AUTO: 13.1 %
ESTIMATED AVERAGE GLUCOSE: 126 MG/DL
GLUCOSE SERPL-MCNC: 82 MG/DL
HBA1C MFR BLD HPLC: 6 %
HCT VFR BLD CALC: 41 %
HDLC SERPL-MCNC: 64 MG/DL
HGB BLD-MCNC: 12.7 G/DL
IMM GRANULOCYTES NFR BLD AUTO: 0.1 %
LDLC SERPL-MCNC: 98 MG/DL
LYMPHOCYTES # BLD AUTO: 1.8 K/UL
LYMPHOCYTES NFR BLD AUTO: 26.5 %
MAN DIFF?: NORMAL
MCHC RBC-ENTMCNC: 27.3 PG
MCHC RBC-ENTMCNC: 31 G/DL
MCV RBC AUTO: 88.2 FL
MICROALBUMIN 24H UR DL<=1MG/L-MCNC: <1.2 MG/DL
MICROALBUMIN/CREAT 24H UR-RTO: NORMAL MG/G
MONOCYTES # BLD AUTO: 0.55 K/UL
MONOCYTES NFR BLD AUTO: 8.1 %
NEUTROPHILS # BLD AUTO: 3.44 K/UL
NEUTROPHILS NFR BLD AUTO: 50.9 %
NONHDLC SERPL-MCNC: 111 MG/DL
PLATELET # BLD AUTO: 411 K/UL
POTASSIUM SERPL-SCNC: 4.3 MMOL/L
PROT SERPL-MCNC: 8 G/DL
RBC # BLD: 4.65 M/UL
RBC # FLD: 14.4 %
SODIUM SERPL-SCNC: 139 MMOL/L
TRIGL SERPL-MCNC: 65 MG/DL
WBC # FLD AUTO: 6.78 K/UL

## 2025-03-31 RX ORDER — FENOFIBRATE 145 MG/1
145 TABLET, COATED ORAL
Refills: 0 | Status: COMPLETED | COMMUNITY
Start: 2025-03-28 | End: 2025-03-31

## 2025-03-31 RX ORDER — ALBUTEROL SULFATE 90 UG/1
108 (90 BASE) INHALANT RESPIRATORY (INHALATION)
Qty: 1 | Refills: 3 | Status: ACTIVE | COMMUNITY
Start: 2025-03-31 | End: 1900-01-01

## 2025-04-02 ENCOUNTER — TRANSCRIPTION ENCOUNTER (OUTPATIENT)
Age: 60
End: 2025-04-02

## 2025-04-11 ENCOUNTER — TRANSCRIPTION ENCOUNTER (OUTPATIENT)
Age: 60
End: 2025-04-11

## 2025-04-15 ENCOUNTER — NON-APPOINTMENT (OUTPATIENT)
Age: 60
End: 2025-04-15

## 2025-04-17 ENCOUNTER — APPOINTMENT (OUTPATIENT)
Dept: INTERNAL MEDICINE | Facility: CLINIC | Age: 60
End: 2025-04-17
Payer: COMMERCIAL

## 2025-04-17 ENCOUNTER — APPOINTMENT (OUTPATIENT)
Dept: ENDOCRINOLOGY | Facility: CLINIC | Age: 60
End: 2025-04-17
Payer: COMMERCIAL

## 2025-04-17 VITALS
OXYGEN SATURATION: 95 % | HEIGHT: 60 IN | SYSTOLIC BLOOD PRESSURE: 141 MMHG | WEIGHT: 142 LBS | DIASTOLIC BLOOD PRESSURE: 88 MMHG | TEMPERATURE: 96.9 F | HEART RATE: 88 BPM | BODY MASS INDEX: 27.88 KG/M2

## 2025-04-17 VITALS
SYSTOLIC BLOOD PRESSURE: 132 MMHG | HEART RATE: 76 BPM | BODY MASS INDEX: 27.34 KG/M2 | WEIGHT: 140 LBS | DIASTOLIC BLOOD PRESSURE: 80 MMHG

## 2025-04-17 DIAGNOSIS — R73.03 PREDIABETES.: ICD-10-CM

## 2025-04-17 DIAGNOSIS — I10 ESSENTIAL (PRIMARY) HYPERTENSION: ICD-10-CM

## 2025-04-17 DIAGNOSIS — J30.89 OTHER ALLERGIC RHINITIS: ICD-10-CM

## 2025-04-17 DIAGNOSIS — E04.2 NONTOXIC MULTINODULAR GOITER: ICD-10-CM

## 2025-04-17 PROCEDURE — 99213 OFFICE O/P EST LOW 20 MIN: CPT

## 2025-04-17 PROCEDURE — G2211 COMPLEX E/M VISIT ADD ON: CPT | Mod: NC

## 2025-04-17 PROCEDURE — 99214 OFFICE O/P EST MOD 30 MIN: CPT | Mod: GC

## 2025-04-17 RX ORDER — MONTELUKAST 10 MG/1
10 TABLET, FILM COATED ORAL
Qty: 90 | Refills: 3 | Status: ACTIVE | COMMUNITY
Start: 2025-04-17 | End: 1900-01-01

## 2025-04-17 RX ORDER — LORATADINE 10 MG
10 CAPSULE ORAL DAILY
Qty: 90 | Refills: 3 | Status: ACTIVE | COMMUNITY
Start: 2025-04-17 | End: 1900-01-01

## 2025-04-21 ENCOUNTER — NON-APPOINTMENT (OUTPATIENT)
Age: 60
End: 2025-04-21

## 2025-06-19 ENCOUNTER — APPOINTMENT (OUTPATIENT)
Dept: PULMONOLOGY | Facility: CLINIC | Age: 60
End: 2025-06-19
Payer: COMMERCIAL

## 2025-06-19 VITALS
HEIGHT: 60 IN | BODY MASS INDEX: 27.48 KG/M2 | SYSTOLIC BLOOD PRESSURE: 122 MMHG | OXYGEN SATURATION: 96 % | HEART RATE: 94 BPM | WEIGHT: 140 LBS | DIASTOLIC BLOOD PRESSURE: 78 MMHG | TEMPERATURE: 97.1 F

## 2025-06-19 PROCEDURE — 99204 OFFICE O/P NEW MOD 45 MIN: CPT | Mod: 25

## 2025-06-19 PROCEDURE — 94010 BREATHING CAPACITY TEST: CPT

## 2025-07-21 NOTE — ED PROVIDER NOTE - PATIENT PORTAL LINK FT
[de-identified] : 60yo female appears well healed s/p right ankle fracture. I recommend she d/c CAM boot and go into a pair of good, supportive shoes. She may WBAT and ROM as tolerated. Orders for outpatient PT to work on strengthening. Patient is clear to RTW. Follow up PRN if any issues.  The patient was given the opportunity to ask questions, and all questions were answered to their satisfaction.  
You can access the FollowMyHealth Patient Portal offered by [facility] by registering at the following website: [email or web address]. By joining [facility]’s FollowMyHealth portal, you will also be able to view your health information using other applications (apps) compatible with our system.

## 2025-07-29 DIAGNOSIS — L29.0 PRURITUS ANI: ICD-10-CM

## 2025-07-29 DIAGNOSIS — Z12.39 ENCOUNTER FOR OTHER SCREENING FOR MALIGNANT NEOPLASM OF BREAST: ICD-10-CM

## 2025-07-29 DIAGNOSIS — R92.8 OTHER ABNORMAL AND INCONCLUSIVE FINDINGS ON DIAGNOSTIC IMAGING OF BREAST: ICD-10-CM

## 2025-07-29 DIAGNOSIS — M54.50 LOW BACK PAIN, UNSPECIFIED: ICD-10-CM

## 2025-07-29 DIAGNOSIS — M75.82 OTHER SHOULDER LESIONS, LEFT SHOULDER: ICD-10-CM

## 2025-07-29 DIAGNOSIS — M51.369: ICD-10-CM

## 2025-07-29 DIAGNOSIS — Z00.01 ENCOUNTER FOR GENERAL ADULT MEDICAL EXAMINATION WITH ABNORMAL FINDINGS: ICD-10-CM

## 2025-07-29 DIAGNOSIS — M75.42 IMPINGEMENT SYNDROME OF LEFT SHOULDER: ICD-10-CM

## 2025-07-29 DIAGNOSIS — M51.26 OTHER INTERVERTEBRAL DISC DISPLACEMENT, LUMBAR REGION: ICD-10-CM

## 2025-07-30 ENCOUNTER — NON-APPOINTMENT (OUTPATIENT)
Age: 60
End: 2025-07-30

## 2025-07-30 ENCOUNTER — RESULT REVIEW (OUTPATIENT)
Age: 60
End: 2025-07-30

## 2025-07-30 ENCOUNTER — APPOINTMENT (OUTPATIENT)
Dept: PULMONOLOGY | Facility: CLINIC | Age: 60
End: 2025-07-30
Payer: COMMERCIAL

## 2025-07-30 ENCOUNTER — APPOINTMENT (OUTPATIENT)
Dept: INTERNAL MEDICINE | Facility: CLINIC | Age: 60
End: 2025-07-30

## 2025-07-30 VITALS
OXYGEN SATURATION: 98 % | HEART RATE: 93 BPM | SYSTOLIC BLOOD PRESSURE: 120 MMHG | BODY MASS INDEX: 27.48 KG/M2 | WEIGHT: 140 LBS | TEMPERATURE: 97.6 F | DIASTOLIC BLOOD PRESSURE: 80 MMHG | HEIGHT: 60 IN

## 2025-07-30 VITALS
SYSTOLIC BLOOD PRESSURE: 137 MMHG | WEIGHT: 140 LBS | HEIGHT: 60 IN | TEMPERATURE: 97.8 F | OXYGEN SATURATION: 97 % | DIASTOLIC BLOOD PRESSURE: 88 MMHG | BODY MASS INDEX: 27.48 KG/M2 | HEART RATE: 81 BPM

## 2025-07-30 DIAGNOSIS — M79.643 PAIN IN UNSPECIFIED HAND: ICD-10-CM

## 2025-07-30 DIAGNOSIS — I10 ESSENTIAL (PRIMARY) HYPERTENSION: ICD-10-CM

## 2025-07-30 DIAGNOSIS — Z12.39 ENCOUNTER FOR OTHER SCREENING FOR MALIGNANT NEOPLASM OF BREAST: ICD-10-CM

## 2025-07-30 DIAGNOSIS — J45.40 MODERATE PERSISTENT ASTHMA, UNCOMPLICATED: ICD-10-CM

## 2025-07-30 DIAGNOSIS — E04.2 NONTOXIC MULTINODULAR GOITER: ICD-10-CM

## 2025-07-30 DIAGNOSIS — R73.03 PREDIABETES.: ICD-10-CM

## 2025-07-30 DIAGNOSIS — F32.1 MAJOR DEPRESSIVE DISORDER, SINGLE EPISODE, MODERATE: ICD-10-CM

## 2025-07-30 DIAGNOSIS — J45.909 UNSPECIFIED ASTHMA, UNCOMPLICATED: ICD-10-CM

## 2025-07-30 DIAGNOSIS — Z23 ENCOUNTER FOR IMMUNIZATION: ICD-10-CM

## 2025-07-30 DIAGNOSIS — Z12.4 ENCOUNTER FOR SCREENING FOR MALIGNANT NEOPLASM OF CERVIX: ICD-10-CM

## 2025-07-30 DIAGNOSIS — Z00.00 ENCOUNTER FOR GENERAL ADULT MEDICAL EXAMINATION W/OUT ABNORMAL FINDINGS: ICD-10-CM

## 2025-07-30 PROCEDURE — 95012 NITRIC OXIDE EXP GAS DETER: CPT

## 2025-07-30 PROCEDURE — 99213 OFFICE O/P EST LOW 20 MIN: CPT | Mod: 25

## 2025-07-30 PROCEDURE — G0009: CPT

## 2025-07-30 PROCEDURE — 99214 OFFICE O/P EST MOD 30 MIN: CPT | Mod: 25

## 2025-07-30 PROCEDURE — 90677 PCV20 VACCINE IM: CPT

## 2025-07-30 PROCEDURE — 99396 PREV VISIT EST AGE 40-64: CPT | Mod: 25

## 2025-07-30 PROCEDURE — 94010 BREATHING CAPACITY TEST: CPT

## 2025-07-30 RX ORDER — IBUPROFEN 600 MG/1
600 TABLET, FILM COATED ORAL DAILY
Qty: 30 | Refills: 0 | Status: ACTIVE | COMMUNITY
Start: 2025-07-30 | End: 1900-01-01

## 2025-07-30 RX ORDER — DICLOFENAC SODIUM 10 MG/G
1 GEL TOPICAL DAILY
Qty: 1 | Refills: 0 | Status: ACTIVE | COMMUNITY
Start: 2025-07-30 | End: 1900-01-01

## 2025-08-05 ENCOUNTER — APPOINTMENT (OUTPATIENT)
Dept: RADIOLOGY | Facility: CLINIC | Age: 60
End: 2025-08-05
Payer: COMMERCIAL

## 2025-08-05 ENCOUNTER — NON-APPOINTMENT (OUTPATIENT)
Age: 60
End: 2025-08-05

## 2025-08-05 ENCOUNTER — APPOINTMENT (OUTPATIENT)
Dept: MAMMOGRAPHY | Facility: CLINIC | Age: 60
End: 2025-08-05

## 2025-08-05 ENCOUNTER — RESULT REVIEW (OUTPATIENT)
Age: 60
End: 2025-08-05

## 2025-08-05 PROCEDURE — 72040 X-RAY EXAM NECK SPINE 2-3 VW: CPT

## 2025-08-05 PROCEDURE — 77063 BREAST TOMOSYNTHESIS BI: CPT

## 2025-08-05 PROCEDURE — 77067 SCR MAMMO BI INCL CAD: CPT

## 2025-08-07 ENCOUNTER — APPOINTMENT (OUTPATIENT)
Dept: OBGYN | Facility: CLINIC | Age: 60
End: 2025-08-07
Payer: COMMERCIAL

## 2025-08-07 VITALS — BODY MASS INDEX: 27.93 KG/M2 | WEIGHT: 143 LBS | DIASTOLIC BLOOD PRESSURE: 80 MMHG | SYSTOLIC BLOOD PRESSURE: 123 MMHG

## 2025-08-07 DIAGNOSIS — R30.0 DYSURIA: ICD-10-CM

## 2025-08-07 DIAGNOSIS — Z78.0 ASYMPTOMATIC MENOPAUSAL STATE: ICD-10-CM

## 2025-08-07 DIAGNOSIS — Z01.419 ENCOUNTER FOR GYNECOLOGICAL EXAMINATION (GENERAL) (ROUTINE) W/OUT ABNORMAL FINDINGS: ICD-10-CM

## 2025-08-07 DIAGNOSIS — M54.2 CERVICALGIA: ICD-10-CM

## 2025-08-07 PROCEDURE — 99386 PREV VISIT NEW AGE 40-64: CPT

## 2025-08-11 LAB
BACTERIA UR CULT: NORMAL
CYTOLOGY CVX/VAG DOC THIN PREP: ABNORMAL
HPV HIGH+LOW RISK DNA PNL CVX: NOT DETECTED

## 2025-09-08 ENCOUNTER — APPOINTMENT (OUTPATIENT)
Dept: INTERNAL MEDICINE | Facility: CLINIC | Age: 60
End: 2025-09-08
Payer: COMMERCIAL

## 2025-09-08 ENCOUNTER — MED ADMIN CHARGE (OUTPATIENT)
Age: 60
End: 2025-09-08

## 2025-09-08 VITALS
HEART RATE: 98 BPM | SYSTOLIC BLOOD PRESSURE: 149 MMHG | HEIGHT: 60 IN | TEMPERATURE: 97.2 F | DIASTOLIC BLOOD PRESSURE: 89 MMHG | OXYGEN SATURATION: 95 % | BODY MASS INDEX: 27.88 KG/M2 | WEIGHT: 142 LBS

## 2025-09-08 DIAGNOSIS — I10 ESSENTIAL (PRIMARY) HYPERTENSION: ICD-10-CM

## 2025-09-08 DIAGNOSIS — Z23 ENCOUNTER FOR IMMUNIZATION: ICD-10-CM

## 2025-09-08 PROCEDURE — G0008: CPT

## 2025-09-08 PROCEDURE — G2211 COMPLEX E/M VISIT ADD ON: CPT | Mod: NC

## 2025-09-08 PROCEDURE — 90656 IIV3 VACC NO PRSV 0.5 ML IM: CPT

## 2025-09-08 PROCEDURE — 36415 COLL VENOUS BLD VENIPUNCTURE: CPT

## 2025-09-08 PROCEDURE — 99214 OFFICE O/P EST MOD 30 MIN: CPT | Mod: GC,25

## 2025-09-08 RX ORDER — ESCITALOPRAM OXALATE 10 MG/1
10 TABLET ORAL DAILY
Qty: 30 | Refills: 1 | Status: ACTIVE | COMMUNITY
Start: 2025-09-08 | End: 1900-01-01

## 2025-09-10 LAB
ALBUMIN SERPL ELPH-MCNC: 4.6 G/DL
ALP BLD-CCNC: 96 U/L
ALT SERPL-CCNC: 40 U/L
ANION GAP SERPL CALC-SCNC: 12 MMOL/L
AST SERPL-CCNC: 31 U/L
BILIRUB SERPL-MCNC: 0.3 MG/DL
BUN SERPL-MCNC: 12 MG/DL
CALCIUM SERPL-MCNC: 9.7 MG/DL
CHLORIDE SERPL-SCNC: 105 MMOL/L
CO2 SERPL-SCNC: 24 MMOL/L
CREAT SERPL-MCNC: 0.79 MG/DL
EGFRCR SERPLBLD CKD-EPI 2021: 85 ML/MIN/1.73M2
ESTIMATED AVERAGE GLUCOSE: 123 MG/DL
GLUCOSE SERPL-MCNC: 91 MG/DL
HBA1C MFR BLD HPLC: 5.9 %
POTASSIUM SERPL-SCNC: 4.5 MMOL/L
PROT SERPL-MCNC: 8.3 G/DL
SODIUM SERPL-SCNC: 141 MMOL/L
TSH SERPL-ACNC: 3.12 UIU/ML
VIT B12 SERPL-MCNC: 757 PG/ML

## 2025-09-11 ENCOUNTER — TRANSCRIPTION ENCOUNTER (OUTPATIENT)
Age: 60
End: 2025-09-11

## 2025-09-18 ENCOUNTER — LABORATORY RESULT (OUTPATIENT)
Age: 60
End: 2025-09-18

## 2025-09-18 ENCOUNTER — APPOINTMENT (OUTPATIENT)
Dept: INTERNAL MEDICINE | Facility: CLINIC | Age: 60
End: 2025-09-18
Payer: COMMERCIAL

## 2025-09-18 ENCOUNTER — APPOINTMENT (OUTPATIENT)
Dept: INTERNAL MEDICINE | Facility: CLINIC | Age: 60
End: 2025-09-18

## 2025-09-18 VITALS
SYSTOLIC BLOOD PRESSURE: 145 MMHG | RESPIRATION RATE: 16 BRPM | HEART RATE: 80 BPM | OXYGEN SATURATION: 97 % | DIASTOLIC BLOOD PRESSURE: 91 MMHG

## 2025-09-18 DIAGNOSIS — R05.9 COUGH, UNSPECIFIED: ICD-10-CM

## 2025-09-18 DIAGNOSIS — R30.0 DYSURIA: ICD-10-CM

## 2025-09-18 DIAGNOSIS — F41.8 OTHER SPECIFIED ANXIETY DISORDERS: ICD-10-CM

## 2025-09-18 PROCEDURE — G2211 COMPLEX E/M VISIT ADD ON: CPT | Mod: NC

## 2025-09-18 PROCEDURE — 99214 OFFICE O/P EST MOD 30 MIN: CPT

## 2025-09-18 PROCEDURE — G0444 DEPRESSION SCREEN ANNUAL: CPT | Mod: 59

## 2025-09-18 RX ORDER — PROMETHAZINE HYDROCHLORIDE 12.5 MG/1
12.5 TABLET ORAL EVERY 6 HOURS
Qty: 28 | Refills: 0 | Status: ACTIVE | COMMUNITY
Start: 2025-09-18 | End: 1900-01-01

## 2025-09-19 ENCOUNTER — NON-APPOINTMENT (OUTPATIENT)
Age: 60
End: 2025-09-19

## 2025-09-19 DIAGNOSIS — N39.0 URINARY TRACT INFECTION, SITE NOT SPECIFIED: ICD-10-CM

## 2025-09-19 LAB
APPEARANCE: CLEAR
BILIRUBIN URINE: NEGATIVE
BLOOD URINE: NEGATIVE
COLOR: YELLOW
GLUCOSE QUALITATIVE U: NEGATIVE MG/DL
KETONES URINE: NEGATIVE MG/DL
LEUKOCYTE ESTERASE URINE: ABNORMAL
NITRITE URINE: NEGATIVE
PH URINE: 7
PROTEIN URINE: NEGATIVE MG/DL
SPECIFIC GRAVITY URINE: 1.02
UROBILINOGEN URINE: 0.2 MG/DL

## 2025-09-19 RX ORDER — NITROFURANTOIN (MONOHYDRATE/MACROCRYSTALS) 25; 75 MG/1; MG/1
100 CAPSULE ORAL
Qty: 14 | Refills: 0 | Status: ACTIVE | COMMUNITY
Start: 2025-09-19 | End: 1900-01-01